# Patient Record
Sex: MALE | Race: WHITE
[De-identification: names, ages, dates, MRNs, and addresses within clinical notes are randomized per-mention and may not be internally consistent; named-entity substitution may affect disease eponyms.]

---

## 2019-08-29 ENCOUNTER — HOSPITAL ENCOUNTER (EMERGENCY)
Dept: HOSPITAL 41 - JD.ED | Age: 24
Discharge: HOME | End: 2019-08-29
Payer: SELF-PAY
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
## 2019-08-29 DIAGNOSIS — F10.129: Primary | ICD-10-CM

## 2019-08-29 DIAGNOSIS — R45.851: ICD-10-CM

## 2019-08-29 PROCEDURE — 80053 COMPREHEN METABOLIC PANEL: CPT

## 2019-08-29 PROCEDURE — 85027 COMPLETE CBC AUTOMATED: CPT

## 2019-08-29 PROCEDURE — 85610 PROTHROMBIN TIME: CPT

## 2019-08-29 PROCEDURE — 93005 ELECTROCARDIOGRAM TRACING: CPT

## 2019-08-29 PROCEDURE — 81001 URINALYSIS AUTO W/SCOPE: CPT

## 2019-08-29 PROCEDURE — 96361 HYDRATE IV INFUSION ADD-ON: CPT

## 2019-08-29 PROCEDURE — 36415 COLL VENOUS BLD VENIPUNCTURE: CPT

## 2019-08-29 PROCEDURE — 80306 DRUG TEST PRSMV INSTRMNT: CPT

## 2019-08-29 PROCEDURE — G0480 DRUG TEST DEF 1-7 CLASSES: HCPCS

## 2019-08-29 PROCEDURE — 99285 EMERGENCY DEPT VISIT HI MDM: CPT

## 2019-08-29 PROCEDURE — 84443 ASSAY THYROID STIM HORMONE: CPT

## 2019-08-29 PROCEDURE — 96374 THER/PROPH/DIAG INJ IV PUSH: CPT

## 2019-08-29 PROCEDURE — 85007 BL SMEAR W/DIFF WBC COUNT: CPT

## 2019-08-29 NOTE — EDM.PDOCBH
<Jorge Alberto Nunez - Last Filed: 08/29/19 06:32>





ED HPI GENERAL MEDICAL PROBLEM





- General


Chief Complaint: Behavioral/Psych


Stated Complaint: [facility]


Time Seen by Provider: 08/29/19 03:35





- History of Present Illness


INITIAL COMMENTS - FREE TEXT/NARRATIVE: 





24-year-old male presents emergency room intoxicated and suicidal.





The patient's been drinking heavier than normal for about the last month and he'

s been somewhat depressed as his significant other left him a couple months ago 

taking their 1 child with her. The patient is been drinking more than normal 

over the last month it is been drinking a little heavy tonight. The patient had 

his 9 mm handgun out and at times he states that point it himself however in 

the he took the round out of the chamber and locked the gun in his neighbors 

garage.








- Related Data


 Allergies











Allergy/AdvReac Type Severity Reaction Status Date / Time


 


No Known Allergies Allergy   Verified 08/29/19 03:38














ED ROS GENERAL





- Review of Systems


Review Of Systems: See Below


Constitutional: Reports: No Symptoms


HEENT: Reports: No Symptoms


Respiratory: Reports: No Symptoms


Cardiovascular: Reports: No Symptoms


Endocrine: Reports: No Symptoms


GI/Abdominal: Reports: No Symptoms


: Reports: No Symptoms


Musculoskeletal: Reports: No Symptoms


Skin: Reports: No Symptoms


Neurological: Reports: No Symptoms


Psychiatric: Reports: Anxiety, Suicidal Ideation, Other (He has some underlying 

posttraumatic stress disorder).  Denies: Agitation, Hallucinations, Homicidal 

Ideation


Hematologic/Lymphatic: Reports: No Symptoms


Immunologic: Reports: No Symptoms





ED EXAM, BEHAVIORAL HEALTH





- Physical Exam


Exam: See Below


Exam Limited By: No Limitations


General Appearance: Alert, No Apparent Distress


Eye Exam: Bilateral Eye: Normal Inspection


Ears: Normal External Exam, Normal Canal, Hearing Grossly Normal, Normal TMs


Nose: Normal Inspection, Normal Mucosa, No Blood


Throat/Mouth: Normal Inspection, Normal Lips, Normal Teeth, Normal Gums, Normal 

Oropharynx, Normal Voice, No Airway Compromise


Head: Atraumatic, Normocephalic


Neck: Normal Inspection, Supple, Non-Tender, Full Range of Motion.  No: 

Lymphadenopathy (L), Lymphadenopathy (R)


Respiratory/Chest: No Respiratory Distress, Lungs Clear, Normal Breath Sounds


Cardiovascular: Regular Rate, Rhythm, No Edema, No Murmur


GI/Abdominal: Normal Bowel Sounds, Soft, Non-Tender


Back Exam: Normal Inspection.  No: CVA Tenderness (L), CVA Tenderness (R), 

Paraspinal Tenderness


Extremities: Normal Inspection, Normal Range of Motion, Non-Tender, No Pedal 

Edema


Neurological: Alert, Normal Mood/Affect, Normal Cognition


Psychiatric: Alert, Normal Affect, Normal Cognition, Normal Mood, Oriented


Skin Exam: Warm, Dry, Intact





COURSE, BEHAVIORAL HEALTH COMP





- Course


Vital Signs: 





 Last Vital Signs











Temp  96.9 F   08/29/19 03:32


 


Pulse  87   08/29/19 03:32


 


Resp  20   08/29/19 03:32


 


BP  140/91 H  08/29/19 03:32


 


Pulse Ox  99   08/29/19 03:32











Orders, Labs, Meds: 





 Active Orders 24 hr











 Category Date Time Status


 


 EKG Documentation Completion [RC] STAT Care  08/29/19 03:54 Active








 Laboratory Tests











  08/29/19 08/29/19 08/29/19 Range/Units





  04:11 04:11 04:11 


 


WBC  8.61    (4.23-9.07)  K/mm3


 


RBC  5.74    (4.63-6.08)  M/mm3


 


Hgb  18.1 H    (13.7-17.5)  gm/L


 


Hct  51.0    (40.1-51.0)  %


 


MCV  88.9    (79.0-92.2)  fl


 


MCH  31.5    (25.7-32.2)  pg


 


MCHC  35.5    (32.2-35.5)  g/dl


 


RDW Std Deviation  41.0    (35.1-43.9)  fL


 


Plt Count  254    (163-337)  K/mm3


 


MPV  9.0 L    (9.4-12.3)  fl


 


Neutrophils % (Manual)  53    (40-60)  %


 


Band Neutrophils %  4    (0-10)  %


 


Lymphocytes % (Manual)  29    (20-40)  %


 


Atypical Lymphs %  0    %


 


Monocytes % (Manual)  7    (2-10)  %


 


Eosinophils % (Manual)  4    (0.8-7.0)  %


 


Basophils % (Manual)  3 H    (0.2-1.2)  


 


Platelet Estimate  Adequate    


 


Plt Morphology Comment  Normal    


 


RBC Morph Comment  Normal    


 


PT   10.5   (9.7-12.0)  SECONDS


 


INR   0.96   


 


Sodium    142  (136-145)  mEq/L


 


Potassium    3.8  (3.5-5.1)  mEq/L


 


Chloride    106  ()  mEq/L


 


Carbon Dioxide    25  (21-32)  mEq/L


 


Anion Gap    14.8  (5-15)  


 


BUN    10  (7-18)  mg/dL


 


Creatinine    0.8  (0.7-1.3)  mg/dL


 


Est Cr Clr Drug Dosing    152.95  mL/min


 


Estimated GFR (MDRD)    > 60  (>60)  mL/min


 


BUN/Creatinine Ratio    12.5 L  (14-18)  


 


Glucose    104  ()  mg/dL


 


Calcium    9.0  (8.5-10.1)  mg/dL


 


Total Bilirubin    0.2  (0.2-1.0)  mg/dL


 


AST    17  (15-37)  U/L


 


ALT    38  (16-63)  U/L


 


Alkaline Phosphatase    90  ()  U/L


 


Total Protein    7.6  (6.4-8.2)  g/dl


 


Albumin    4.3  (3.4-5.0)  g/dl


 


Globulin    3.3  gm/dL


 


Albumin/Globulin Ratio    1.3  (1-2)  


 


TSH 3rd Generation    3.934 H  (0.358-3.74)  uIU/mL


 


Urine Color     (Yellow)  


 


Urine Appearance     (Clear)  


 


Urine pH     (5.0-8.0)  


 


Ur Specific Gravity     (1.005-1.030)  


 


Urine Protein     (Negative)  


 


Urine Glucose (UA)     (Negative)  


 


Urine Ketones     (Negative)  


 


Urine Occult Blood     (Negative)  


 


Urine Nitrite     (Negative)  


 


Urine Bilirubin     (Negative)  


 


Urine Urobilinogen     (0.2-1.0)  


 


Ur Leukocyte Esterase     (Negative)  


 


Urine RBC     (0-5)  /hpf


 


Urine WBC     (0-5)  /hpf


 


Ur Epithelial Cells     (0-5)  /hpf


 


Urine Bacteria     (FEW)  /hpf


 


Urine Mucus     (FEW)  /hpf


 


Urine Opiates Screen     (GHKOVG=847)  


 


Ur Buprenorphine Scrn     (CUTOFF=10)  


 


Ur Oxycodone Screen     (LXS0MT=894)  


 


Urine Methadone Screen     (MAT9SR=498)  


 


Ur Propoxyphene Screen     (RQUDHO=847)  


 


Ur Barbiturates Screen     (QHMUAZ=150)  


 


Ur Tricyclics Screen     (PHVNSN=093)  


 


Ur Phencyclidine Scrn     (CUTOFF=25)  


 


Ur Amphetamine Screen     (CWOESN=249)  


 


U Methamphetamines Scrn     (OBQUEA=895)  


 


U Benzodiazepines Scrn     (OLWPLS=264)  


 


U Cocaine Metab Screen     (BKVPXT=255)  


 


U Marijuana (THC) Screen     (CUTOFF=50)  


 


Ethyl Alcohol    0.13  (0.00)  gm%














  08/29/19 08/29/19 Range/Units





  04:45 04:45 


 


WBC    (4.23-9.07)  K/mm3


 


RBC    (4.63-6.08)  M/mm3


 


Hgb    (13.7-17.5)  gm/L


 


Hct    (40.1-51.0)  %


 


MCV    (79.0-92.2)  fl


 


MCH    (25.7-32.2)  pg


 


MCHC    (32.2-35.5)  g/dl


 


RDW Std Deviation    (35.1-43.9)  fL


 


Plt Count    (163-337)  K/mm3


 


MPV    (9.4-12.3)  fl


 


Neutrophils % (Manual)    (40-60)  %


 


Band Neutrophils %    (0-10)  %


 


Lymphocytes % (Manual)    (20-40)  %


 


Atypical Lymphs %    %


 


Monocytes % (Manual)    (2-10)  %


 


Eosinophils % (Manual)    (0.8-7.0)  %


 


Basophils % (Manual)    (0.2-1.2)  


 


Platelet Estimate    


 


Plt Morphology Comment    


 


RBC Morph Comment    


 


PT    (9.7-12.0)  SECONDS


 


INR    


 


Sodium    (136-145)  mEq/L


 


Potassium    (3.5-5.1)  mEq/L


 


Chloride    ()  mEq/L


 


Carbon Dioxide    (21-32)  mEq/L


 


Anion Gap    (5-15)  


 


BUN    (7-18)  mg/dL


 


Creatinine    (0.7-1.3)  mg/dL


 


Est Cr Clr Drug Dosing    mL/min


 


Estimated GFR (MDRD)    (>60)  mL/min


 


BUN/Creatinine Ratio    (14-18)  


 


Glucose    ()  mg/dL


 


Calcium    (8.5-10.1)  mg/dL


 


Total Bilirubin    (0.2-1.0)  mg/dL


 


AST    (15-37)  U/L


 


ALT    (16-63)  U/L


 


Alkaline Phosphatase    ()  U/L


 


Total Protein    (6.4-8.2)  g/dl


 


Albumin    (3.4-5.0)  g/dl


 


Globulin    gm/dL


 


Albumin/Globulin Ratio    (1-2)  


 


TSH 3rd Generation    (0.358-3.74)  uIU/mL


 


Urine Color  Light yellow   (Yellow)  


 


Urine Appearance  Clear   (Clear)  


 


Urine pH  6.5   (5.0-8.0)  


 


Ur Specific Gravity  1.010   (1.005-1.030)  


 


Urine Protein  Negative   (Negative)  


 


Urine Glucose (UA)  Negative   (Negative)  


 


Urine Ketones  Negative   (Negative)  


 


Urine Occult Blood  Negative   (Negative)  


 


Urine Nitrite  Negative   (Negative)  


 


Urine Bilirubin  Negative   (Negative)  


 


Urine Urobilinogen  0.2   (0.2-1.0)  


 


Ur Leukocyte Esterase  Negative   (Negative)  


 


Urine RBC  0-5   (0-5)  /hpf


 


Urine WBC  0-5   (0-5)  /hpf


 


Ur Epithelial Cells  Not seen   (0-5)  /hpf


 


Urine Bacteria  Not seen   (FEW)  /hpf


 


Urine Mucus  Not seen   (FEW)  /hpf


 


Urine Opiates Screen   Negative  (IHUDZV=879)  


 


Ur Buprenorphine Scrn   Negative  (CUTOFF=10)  


 


Ur Oxycodone Screen   Negative  (FQT9OS=089)  


 


Urine Methadone Screen   Negative  (OEQ0MU=706)  


 


Ur Propoxyphene Screen   Negative  (ZEWBTM=439)  


 


Ur Barbiturates Screen   Negative  (VUCYTX=050)  


 


Ur Tricyclics Screen   Negative  (ABNMQP=078)  


 


Ur Phencyclidine Scrn   Negative  (CUTOFF=25)  


 


Ur Amphetamine Screen   Negative  (KPVMGT=686)  


 


U Methamphetamines Scrn   Negative  (GEYVSS=862)  


 


U Benzodiazepines Scrn   Negative  (SBSFKT=098)  


 


U Cocaine Metab Screen   Negative  (LPVEQG=077)  


 


U Marijuana (THC) Screen   Negative  (CUTOFF=50)  


 


Ethyl Alcohol    (0.00)  gm%








Medications














Discontinued Medications














Generic Name Dose Route Start Last Admin





  Trade Name Freq  PRN Reason Stop Dose Admin


 


Lactated Ringer's  1,000 mls @ 999 mls/hr  08/29/19 03:58  08/29/19 04:17





  Ringers, Lactated  IV  08/29/19 04:58  999 mls/hr





  .BOLUS ONE   Administration





     





     





     





     


 


Ondansetron HCl  4 mg  08/29/19 03:58  08/29/19 04:17





  Zofran  IVPUSH  08/29/19 03:59  4 mg





  ONETIME ONE   Administration





     





     





     





     











Medical Clearance: 





08/29/19 06:38


At this time the patient denies being suicidal and is apologetic for the 

problems he may have caused. His blood alcohol was 0.13 at 03:54 he has not had 

enough time to fully sober up.











Departure





- Departure


Disposition: Home, Self-Care 01


Clinical Impression: 


 Alcohol abuse, Suicidal ideation








- Discharge Information


Referrals: 


PCP,None [Primary Care Provider] - 


Forms:  ED Department Discharge


Additional Instructions: 


Return to the ER with any questions or problems. 





Follow up with [facility]. [phone]





<Adrian Nguyen A - Last Filed: 08/29/19 09:31>





COURSE, BEHAVIORAL HEALTH COMP





- Course


Medical Clearance: 








08/29/19 09:30


Taking over for Dr Nunez.  The patient is awake and he is not suicidal now.  

I will discharge him home.





Departure





- Departure


Time of Disposition: 09:30





- Discharge Information


*PRESCRIPTION DRUG MONITORING PROGRAM REVIEWED*: No


*COPY OF PRESCRIPTION DRUG MONITORING REPORT IN PATIENT RAVIN: No